# Patient Record
Sex: FEMALE | Race: WHITE
[De-identification: names, ages, dates, MRNs, and addresses within clinical notes are randomized per-mention and may not be internally consistent; named-entity substitution may affect disease eponyms.]

---

## 2006-11-26 VITALS — HEART RATE: 92 BPM | TEMPERATURE: 98.1 F

## 2017-02-21 ENCOUNTER — HOSPITAL ENCOUNTER (OUTPATIENT)
Dept: HOSPITAL 19 - MC.RAD | Age: 42
End: 2017-02-21
Payer: COMMERCIAL

## 2017-02-21 DIAGNOSIS — Z12.31: Primary | ICD-10-CM

## 2019-06-21 ENCOUNTER — HOSPITAL ENCOUNTER (OUTPATIENT)
Dept: HOSPITAL 19 - SDCO | Age: 44
Discharge: HOME | End: 2019-06-21
Attending: SURGERY
Payer: COMMERCIAL

## 2019-06-21 VITALS — SYSTOLIC BLOOD PRESSURE: 114 MMHG | DIASTOLIC BLOOD PRESSURE: 63 MMHG | HEART RATE: 64 BPM | TEMPERATURE: 97.4 F

## 2019-06-21 VITALS — HEART RATE: 68 BPM | SYSTOLIC BLOOD PRESSURE: 102 MMHG | DIASTOLIC BLOOD PRESSURE: 72 MMHG

## 2019-06-21 VITALS — TEMPERATURE: 97.4 F

## 2019-06-21 VITALS — SYSTOLIC BLOOD PRESSURE: 108 MMHG | HEART RATE: 70 BPM | DIASTOLIC BLOOD PRESSURE: 69 MMHG

## 2019-06-21 VITALS — HEIGHT: 64 IN | BODY MASS INDEX: 22.62 KG/M2 | WEIGHT: 132.5 LBS

## 2019-06-21 VITALS — SYSTOLIC BLOOD PRESSURE: 115 MMHG | TEMPERATURE: 98.1 F | HEART RATE: 70 BPM | DIASTOLIC BLOOD PRESSURE: 71 MMHG

## 2019-06-21 VITALS — SYSTOLIC BLOOD PRESSURE: 106 MMHG | DIASTOLIC BLOOD PRESSURE: 68 MMHG | HEART RATE: 75 BPM

## 2019-06-21 VITALS — DIASTOLIC BLOOD PRESSURE: 65 MMHG | HEART RATE: 68 BPM | SYSTOLIC BLOOD PRESSURE: 106 MMHG

## 2019-06-21 DIAGNOSIS — Z83.3: ICD-10-CM

## 2019-06-21 DIAGNOSIS — Z88.1: ICD-10-CM

## 2019-06-21 DIAGNOSIS — E03.9: ICD-10-CM

## 2019-06-21 DIAGNOSIS — K40.30: Primary | ICD-10-CM

## 2019-06-21 PROCEDURE — C1781 MESH (IMPLANTABLE): HCPCS

## 2019-06-21 NOTE — NUR
Pt returned from surgery to outpatient care. Pt easily awaken, denies n/v at
this time. Denies wanting pain medication, and states, 'wanting to sleep". Ice
chips offered. VS taken ad are WNL. Will continue to monitor progress.

## 2019-06-21 NOTE — NUR
Dr. Lopez was notified of the patient's current rash to her abodmen. He
verbalized understanding, questions were answered and he is going to come
assess the patient's abdomen prior to surgery.

## 2019-06-21 NOTE — NUR
Pt continuing to eat crackers and states the need to void. Pt stated she
'would like to get dressed and use the bathroom.' Pt  helped pt go to
the bathroom and change clothes. Fluids unhooked from the int. needle. Denies
pain and N/V. will cont to monitor.

## 2019-06-21 NOTE — NUR
Pt awake, denies pain, has been eating ice chips and asked for water, she
wasnt interested in drinking anything else; offered juice, and pop. Offered
crackers. She asked that I call her , Srinath and provided me his cell
phone number. I asked her if she was feeling the need to void, stated 'she
wanted to drink more water and then may be ready.' denies N/V. VS WNL will
continue to monitor.

## 2019-06-21 NOTE — NUR
The patient reported upon admission that she has a rash present to her
abdomen. She states she thinks it is from a "food allergy or tanning lotion".
TADEO Steiner, along with the nurse, assessed the patient abdomen for the
reported rash. There is a red rash present intermittently to most of her
abodomen. There appears to be no open area or drainage present at this time.
Dr. Lopez is to be notified of the patient's rash.

## 2019-06-21 NOTE — NUR
Pt tolerating water and crackers. is starting to feel alittle  discomfort,
Tylenol 1000mg given PO. IV was dc'd. discharge instructions given and pt
voices understanding. pt stated that her abd was itching and wondered if her
surgeon would order something for the itch. Dr Lopez stated to have pt get
topical itch cream and benadryl. Instructed pt to call general practitioner
for further needs or issues r/t abdominal rash/itch. pt felt rash was r/t a
food allery or tan cream that she had been using. VS continue to be WNL and
cont to deny N/V. DC instructions given and scripts given.
[facility]. Pt  was
driving them to their home.

## 2020-12-15 ENCOUNTER — HOSPITAL ENCOUNTER (OUTPATIENT)
Dept: HOSPITAL 19 - MC.RAD | Age: 45
End: 2020-12-15
Payer: COMMERCIAL

## 2020-12-15 DIAGNOSIS — Z12.31: Primary | ICD-10-CM

## 2020-12-15 DIAGNOSIS — R92.0: ICD-10-CM

## 2020-12-18 ENCOUNTER — HOSPITAL ENCOUNTER (OUTPATIENT)
Dept: HOSPITAL 19 - MC.RAD | Age: 45
End: 2020-12-18
Payer: COMMERCIAL

## 2020-12-18 DIAGNOSIS — R92.0: Primary | ICD-10-CM

## 2023-03-21 ENCOUNTER — HOSPITAL ENCOUNTER (OUTPATIENT)
Dept: HOSPITAL 19 - MC.RAD | Age: 48
End: 2023-03-21
Attending: OBSTETRICS & GYNECOLOGY
Payer: COMMERCIAL

## 2023-03-21 DIAGNOSIS — Z12.31: Primary | ICD-10-CM
